# Patient Record
Sex: MALE | Race: WHITE | URBAN - METROPOLITAN AREA
[De-identification: names, ages, dates, MRNs, and addresses within clinical notes are randomized per-mention and may not be internally consistent; named-entity substitution may affect disease eponyms.]

---

## 2017-10-10 ENCOUNTER — OFFICE VISIT (OUTPATIENT)
Dept: SURGERY | Age: 42
End: 2017-10-10

## 2017-10-10 VITALS
HEART RATE: 61 BPM | BODY MASS INDEX: 36.65 KG/M2 | DIASTOLIC BLOOD PRESSURE: 82 MMHG | WEIGHT: 256 LBS | RESPIRATION RATE: 18 BRPM | SYSTOLIC BLOOD PRESSURE: 130 MMHG | OXYGEN SATURATION: 97 % | HEIGHT: 70 IN | TEMPERATURE: 97.8 F

## 2017-10-10 DIAGNOSIS — K64.5 THROMBOSED EXTERNAL HEMORRHOID: Primary | ICD-10-CM

## 2017-10-10 RX ORDER — HYDROCORTISONE 25 MG/G
CREAM TOPICAL
Qty: 30 G | Refills: 0 | Status: SHIPPED | OUTPATIENT
Start: 2017-10-10

## 2017-10-10 NOTE — PATIENT INSTRUCTIONS
If you have any questions or concerns about today's appointment, the verbal and/or written instructions you were given for follow up care, please call our office at [phone].     [facility] - [address]    [phone] office  [phone]

## 2017-10-10 NOTE — PROGRESS NOTES
[facility]  Colon and Rectal Surgery  [address] [address]              Colon and Rectal Surgery        Patient: Luvenia Boxer  MRN: [id number]  Date: 10/10/2017     Age:  40 y.o.,      Sex: male    YOB: 1975      Subjective     is an 40 y.o. male who presents with a history of recent onset of a tender anal mass. This occurred about 10 days ago. The patient complained of only mild pain. He then experienced spontaneous drainage of dark blood and clots with shrinkage of the mass. Since then has has been noticing only scant amount of bleeding. He has not had previous rectal surgery.  denies associated fever. A history of inflammatory bowel disease has not been reported. The patient denies any change in bowel habits, weight changes, nor any abdominal pain. Patient denies constipation, vomiting, diarrhea, difficulty swallowing, loss of appetite, reflux and nausea. Bowel habits are usually reported as normal without unsual diarrhea, constipation, blood or pain. The family history is negative for colon cancer/polyps, other GI malignancies, nor inflammatory bowel diseases. Past Medical History:   Diagnosis Date    Hand fracture - metacarpal bone 1/2000       Past Surgical History:   Procedure Laterality Date    HX WISDOM TEETH EXTRACTION  9/1994       No Known Allergies    Prior to Admission medications    Medication Sig Start Date End Date Taking? Authorizing Provider   oxyCODONE-acetaminophen (PERCOCET) 5-325 mg per tablet Take 1-2 Tabs by mouth every six (6) hours as needed for Pain. 11/15/13   Roxy Tran MD       Current Outpatient Prescriptions   Medication Sig Dispense Refill    oxyCODONE-acetaminophen (PERCOCET) 5-325 mg per tablet Take 1-2 Tabs by mouth every six (6) hours as needed for Pain.  30 Tab 0       Social History     Social History    Marital status: UNKNOWN     Spouse name: N/A    Number of children: N/A    Years of education: N/A     Occupational History    Not on file. Social History Main Topics    Smoking status: Never Smoker    Smokeless tobacco: Never Used    Alcohol use No    Drug use: No    Sexual activity: Yes     Partners: Female     Other Topics Concern    Not on file     Social History Narrative       Family History   Problem Relation Age of Onset    Cancer Father      prostate           Review of Systems:    A comprehensive review of systems was negative except for that written in the History of Present Illness. Objective:        Visit Vitals    /82    Pulse 61    Temp 97.8 °F (36.6 °C) (Oral)    Resp 18    Ht 5' 10\" (1.778 m)    Wt 116.1 kg (256 lb)    SpO2 97%    BMI 36.73 kg/m2       Physical Exam:   GENERAL: alert, cooperative, no distress, appears stated age  LUNG: clear to auscultation bilaterally  HEART: regular rate and rhythm, S1, S2 normal, no murmur, click, rub or gallop  EXTREMITIES:  extremities normal, atraumatic, no cyanosis or edema     Anorectal:  With the patient in the prone position the anus appeared abnormal with findings of a resolving thrombosed external hemorrhoid disease in the left lateral quadrant with a small clean ulceration at the surface. I evacuated small amount of residual thrombus without any discomfort for the patient. Digital rectal examination revealed increased sphincter tone and squeeze pressure. Palpation revealed No Masses. Assessment / Greg Gilmore is an 40 y.o. male with resolving thrombosed external hemorrhoid disease. The patient was reassured, and I recommended starting hemorrhoid management regimen consisting of:    1. Frequent sitz baths at home. 2. High fiber diet. 3. Application of Proctosol HC cream as instructed. The patient will follow up in my clinic as needed.             Darya Fleming MD, FACS, FASCRS  Colon and Rectal Surgery  [facility]  Office [phone]  Fax     [phone]  10/10/2017  1:57 PM